# Patient Record
Sex: MALE
[De-identification: names, ages, dates, MRNs, and addresses within clinical notes are randomized per-mention and may not be internally consistent; named-entity substitution may affect disease eponyms.]

---

## 2023-07-07 PROBLEM — Z00.00 ENCOUNTER FOR PREVENTIVE HEALTH EXAMINATION: Status: ACTIVE | Noted: 2023-07-07

## 2023-07-18 ENCOUNTER — APPOINTMENT (OUTPATIENT)
Dept: ORTHOPEDIC SURGERY | Facility: CLINIC | Age: 73
End: 2023-07-18
Payer: MEDICARE

## 2023-07-18 VITALS — WEIGHT: 170 LBS | HEIGHT: 68 IN | BODY MASS INDEX: 25.76 KG/M2

## 2023-07-18 DIAGNOSIS — E78.00 PURE HYPERCHOLESTEROLEMIA, UNSPECIFIED: ICD-10-CM

## 2023-07-18 DIAGNOSIS — I10 ESSENTIAL (PRIMARY) HYPERTENSION: ICD-10-CM

## 2023-07-18 PROCEDURE — J3490M: CUSTOM | Mod: NC

## 2023-07-18 PROCEDURE — 73030 X-RAY EXAM OF SHOULDER: CPT | Mod: LT

## 2023-07-18 PROCEDURE — 20611 DRAIN/INJ JOINT/BURSA W/US: CPT | Mod: LT

## 2023-07-18 PROCEDURE — 99204 OFFICE O/P NEW MOD 45 MIN: CPT | Mod: 25

## 2023-07-18 NOTE — PROCEDURE
[Large Joint Injection] : Large joint injection [Left] : of the left [Shoulder] : shoulder [Inflammation] : inflammation [Alcohol] : alcohol [Betadine] : betadine [Ethyl Chloride sprayed topically] : ethyl chloride sprayed topically [Sterile technique used] : sterile technique used [___ cc    3mg] :  Betamethasone (Celestone) ~Vcc of 3mg [___ cc    0.25%] : Bupivacaine (Marcaine) ~Vcc of 0.25%  [Call if redness, pain or fever occur] : call if redness, pain or fever occur [Apply ice for 15min out of every hour for the next 12-24 hours as tolerated] : apply ice for 15 minutes out of every hour for the next 12-24 hours as tolerated [Patient was advised to rest the joint(s) for ____ days] : patient was advised to rest the joint(s) for [unfilled] days [Previous OTC use and PT nontherapeutic] : patient has tried OTC's including aspirin, Ibuprofen, Aleve, etc or prescription NSAIDS, and/or exercises at home and/or physical therapy without satisfactory response [Risks, benefits, alternatives discussed / Verbal consent obtained] : the risks benefits, and alternatives have been discussed, and verbal consent was obtained [Prior failure or difficult injection] : prior failure or difficult injection [All ultrasound images have been permanently captured and stored accordingly in our picture archiving and communication system] : All ultrasound images have been permanently captured and stored accordingly in our picture archiving and communication system [Visualization of the needle and placement of injection was performed without complication] : visualization of the needle and placement of injection was performed without complication [Pain] : pain

## 2023-07-19 NOTE — DISCUSSION/SUMMARY
[Medication Risks Reviewed] : Medication risks reviewed [Surgical risks reviewed] : Surgical risks reviewed [de-identified] : X-Ray left shoulder reveals mild degenerative changes. \par \par Due to worsening pain and instability with mechanical symptoms, recommend the patient obtain CT arthrogram left shoulder to rule out rotator cuff tear. Follow up after MRI to possibly rule out surgical pathology and discuss future treatment options. \par \par The patient is not a candidate for MRI secondary to schrap metal in his body\par \par Discussed treatment options in the form of injection therapy. Patient elected to receive LEFT SHOULDER 9/2 CSI under ultrasound guidance. Advised patient to rest and ice the area. \par The risks, benefits and contents of the injection have been discussed. Risks include but are not limited to allergic reaction, flare reaction, permanent white skin discoloration at the injection site and infection.  The patient understands the risks and agrees to having the injection.  All questions have been answered.\par Discussed the timing of the injections and the follow up that is needed. Advised the patient to ice the area that was injected and that it may take a few days before experiencing relief.\par \par signs and symptoms of rotator cuff tear, discussed risks of potential rotator cuff  surgery and risks of operative  and non operative treatment of of tendonitis and impingement, will obtain mri to see if surgery is necessary and guide future treatment, in interim discussed use of nsaids and side effects and recommended rehab and discussed risks and benefits of injection \par \par Hx of rotator cuff tear on contralateral shoulder \par Prescribed patient Mobic, and discussed risks of side effects and timing and management of medication.  Side effects can include but are not limited to gi ulcers and irritation, as well as kidney failure and bleeding issues. Use as directed and take with food.\par

## 2023-07-19 NOTE — HISTORY OF PRESENT ILLNESS
[de-identified] : New patient is here for left shoulder. Has been experiencing pain in the left shoulder for about 2 weeks, no specific injury. Has pain in the left shoulder when doing certain movements, exercises and getting dressed. No previous issues with the left shoulder. Pain in left shoulder does not radiate, no n/t. ROM is not impacted. Interested in possibility of injection if possible. Takes Ibuprofen as needed.\par Previously had surgery with Dr. Cornejo on right shoulder a few years ago, no issues now.

## 2023-07-19 NOTE — PHYSICAL EXAM
[5 ___] : forward flexion 5[unfilled]/5 [4___] : external rotation 4[unfilled]/5 [5___] : internal rotation 5[unfilled]/5 [Left] : left shoulder [NL (0-180)] : full active abduction 0-180 degrees [NL (0-70)] : full internal rotation 0-70 degrees [NL (0-90)] : internal rotation at 90 degrees of abduction 0-90 degrees [NL ()] : external rotation at 90 degrees of abduction 50 -110 degrees [Supine] : supine [Sitting] : sitting [Minimal] : minimal [] : negative sulcus sign [FreeTextEntry1] : X-Ray left shoulder reveals mild degenerative changes.

## 2023-07-26 ENCOUNTER — RESULT REVIEW (OUTPATIENT)
Age: 73
End: 2023-07-26

## 2023-07-31 ENCOUNTER — APPOINTMENT (OUTPATIENT)
Dept: ORTHOPEDIC SURGERY | Facility: CLINIC | Age: 73
End: 2023-07-31
Payer: MEDICARE

## 2023-07-31 VITALS — WEIGHT: 170 LBS | HEIGHT: 68 IN | BODY MASS INDEX: 25.76 KG/M2

## 2023-07-31 PROCEDURE — 99214 OFFICE O/P EST MOD 30 MIN: CPT

## 2023-08-01 NOTE — PHYSICAL EXAM
[NL (0-180)] : full active forward flexion 0-180 degrees [NL (0-70)] : full internal rotation 0-70 degrees [NL (0-90)] : internal rotation at 90 degrees of abduction 0-90 degrees [NL ()] : external rotation at 90 degrees of abduction 50 -110 degrees [Supine] : supine [Sitting] : sitting [Minimal] : minimal [5 ___] : forward flexion 5[unfilled]/5 [4___] : external rotation 4[unfilled]/5 [5___] : internal rotation 5[unfilled]/5 [Left] : left shoulder [] : negative sulcus sign [FreeTextEntry1] : X-Ray left shoulder reveals mild degenerative changes.

## 2023-08-01 NOTE — DISCUSSION/SUMMARY
[Medication Risks Reviewed] : Medication risks reviewed [Surgical risks reviewed] : Surgical risks reviewed [de-identified] : Patient expressed no relief from previous cortisone injection (7/18/23 )  st arthrogram of left shoulder revealed high grade articular surface partial tear in the anterior supraspinatus with full thickness component The risks and benefits of surgery have been discussed. Risks include but are not limited to bleeding, infection, reaction to anesthesia, injury to blood vessels and nerves, malunion, nonunion, DVT, PE, necessity of repeat surgery, chronic pain, loss of limb and death. The patient understands the risks and has chosen to proceed with conservative care. All questions have been answered. Discussed activity modification and limiting overhead movements to prevent further aggravation.  We reviewed the mri findings. We discussed treatment options, both operative and non operative. I do think he is a candidate for surgery. Pain relief is a goal as well as improving function and motion.   Interscalene anesthesia, general anesthesia and post operative pain management were discussed. The importance of physical therapy postoperative, the gradual recovery and and the rehabilitation program with initial driving restrictions were noted. The use of a sling for for functional recovery was reviewed. Patient understands there are no garauntees. The benefits of decreased pain, increased function and restoring anatomy were outlined. The risks were reviewed including, but not limited to, infection, failure, bleeding, stiffness, pain, clotting, fracture, retear, hardware failure, deformity, functional limitation, scarring, neurovascular compromise, and narcotic use issues. Under certain circumstances we discussed, further surgery may be indicated.   Patient understands 100% recovery is not expected, and the desired level of function may not be achievable. The complicated nature of the patients condition, including the tear pattern, was noted. We discussed the potential for a prolonged recovery course and the potential for this to affect the patients activites, which could include work regimen. Patients questions were answered. Other options can be pursued, as we discussed.   Patient does wish to proceed with surgery. THis could include shoulder arthoscopy, debridement, synovectomy, decompression, posterior capsuleyosis of adhesions, medium cuff repair, possible biceps tenodesis. We will schedule this at the earliest mutual convenient time. St vs LT issues noted. NSAID uses outlined. Patient will continue HEP, including sleeper stretch. Start PT, wants to try and avoid surgery F/u in 4 weeks

## 2023-08-01 NOTE — HISTORY OF PRESENT ILLNESS
[de-identified] : Patient is here to follow up on CT for left shoulder. Celestone injection from 7/18/23 gave no relief. No improvement. Pain is only present with lifting.

## 2023-09-05 ENCOUNTER — APPOINTMENT (OUTPATIENT)
Dept: ORTHOPEDIC SURGERY | Facility: CLINIC | Age: 73
End: 2023-09-05

## 2023-09-12 ENCOUNTER — APPOINTMENT (OUTPATIENT)
Dept: ORTHOPEDIC SURGERY | Facility: CLINIC | Age: 73
End: 2023-09-12
Payer: MEDICARE

## 2023-09-12 VITALS — BODY MASS INDEX: 25.76 KG/M2 | HEIGHT: 68 IN | WEIGHT: 170 LBS

## 2023-09-12 DIAGNOSIS — S46.012A STRAIN OF MUSCLE(S) AND TENDON(S) OF THE ROTATOR CUFF OF LEFT SHOULDER, INITIAL ENCOUNTER: ICD-10-CM

## 2023-09-12 DIAGNOSIS — M75.52 BURSITIS OF LEFT SHOULDER: ICD-10-CM

## 2023-09-12 PROCEDURE — 99215 OFFICE O/P EST HI 40 MIN: CPT

## 2023-09-12 RX ORDER — OLMESARTAN MEDOXOMIL / AMLODIPINE BESYLATE / HYDROCHLOROTHIAZIDE 40; 10; 25 MG/1; MG/1; MG/1
TABLET, FILM COATED ORAL
Refills: 0 | Status: ACTIVE | COMMUNITY

## 2023-09-12 RX ORDER — SIMVASTATIN 80 MG/1
TABLET, FILM COATED ORAL
Refills: 0 | Status: ACTIVE | COMMUNITY

## 2023-09-12 RX ORDER — FINASTERIDE 1 MG/1
TABLET ORAL
Refills: 0 | Status: ACTIVE | COMMUNITY

## 2023-09-13 PROBLEM — S46.012A TRAUMATIC INCOMPLETE TEAR OF LEFT ROTATOR CUFF, INITIAL ENCOUNTER: Status: ACTIVE | Noted: 2023-07-18

## 2023-09-18 PROBLEM — M75.52 BURSITIS OF LEFT SHOULDER: Status: ACTIVE | Noted: 2023-07-19

## 2023-11-27 ENCOUNTER — APPOINTMENT (OUTPATIENT)
Dept: ORTHOPEDIC SURGERY | Facility: CLINIC | Age: 73
End: 2023-11-27

## 2023-12-13 RX ORDER — OXYCODONE AND ACETAMINOPHEN 10; 325 MG/1; MG/1
10-325 TABLET ORAL
Qty: 40 | Refills: 0 | Status: ACTIVE | COMMUNITY
Start: 2023-12-13

## 2023-12-14 ENCOUNTER — APPOINTMENT (OUTPATIENT)
Age: 73
End: 2023-12-14
Payer: MEDICARE

## 2023-12-14 PROCEDURE — 29826 SHO ARTHRS SRG DECOMPRESSION: CPT | Mod: LT

## 2023-12-14 PROCEDURE — 29823 SHO ARTHRS SRG XTNSV DBRDMT: CPT | Mod: LT

## 2023-12-14 PROCEDURE — 29827 SHO ARTHRS SRG RT8TR CUF RPR: CPT | Mod: LT

## 2023-12-14 PROCEDURE — 29824 SHO ARTHRS SRG DSTL CLAVICLC: CPT | Mod: LT

## 2023-12-14 PROCEDURE — 29828 SHO ARTHRS SRG BICP TENODSIS: CPT | Mod: LT

## 2023-12-18 ENCOUNTER — APPOINTMENT (OUTPATIENT)
Dept: ORTHOPEDIC SURGERY | Facility: CLINIC | Age: 73
End: 2023-12-18
Payer: MEDICARE

## 2023-12-18 VITALS — BODY MASS INDEX: 25.76 KG/M2 | HEIGHT: 68 IN | WEIGHT: 170 LBS

## 2023-12-18 PROCEDURE — 99024 POSTOP FOLLOW-UP VISIT: CPT

## 2023-12-22 NOTE — HISTORY OF PRESENT ILLNESS
[de-identified] : Patient is here for a 1st post op visit from sx on 12/14/23 - left shoulder RTC repair with biceps tenodesis. No issues or concerns since sx.

## 2023-12-22 NOTE — PHYSICAL EXAM
[Left] : left shoulder [] : no drainage [FreeTextEntry9] :  Limited secondary to recent surgery - stable throughout range of motion. [de-identified] :  Limited secondary to recent surgery -

## 2023-12-22 NOTE — DISCUSSION/SUMMARY
[de-identified] :  The patient is approximately 4 days  s/p  left shoulder arthroscopy with rotator cuff repair and biceps tenodesis, and intraarticular circumferential debridement of the labrum and chondral surfaces, and resection of the distal end of the clavicle (DOS: 12/14/23  ) - normal course with good progress and no evidence of infection. Incision sites are well approximated, clean, dry, intact, without drainage, without erythema.   The patient is instructed in wound management.    The patient's post-op plan, protocol and activity modifications have been thoroughly discussed and the patient expressed understanding. Start physical therapy  Continue use of sling  and pillow till he starts physical therapy   Follow up in  3 weeks   IChela, attest that this documentation has been prepared under the direction and in the presence of Provider Dr. Po Cornejo

## 2024-01-09 ENCOUNTER — APPOINTMENT (OUTPATIENT)
Dept: ORTHOPEDIC SURGERY | Facility: CLINIC | Age: 74
End: 2024-01-09

## 2024-01-09 ENCOUNTER — APPOINTMENT (OUTPATIENT)
Dept: ORTHOPEDIC SURGERY | Facility: CLINIC | Age: 74
End: 2024-01-09
Payer: MEDICARE

## 2024-01-09 PROCEDURE — 99024 POSTOP FOLLOW-UP VISIT: CPT

## 2024-01-14 NOTE — HISTORY OF PRESENT ILLNESS
[de-identified] : Patient is here for a post op visit from s on 12/14/23 - left shoulder RTC repair with biceps tenodesis. Currently doing [facility]. Notes improvement, ROM is progressing. Wearing sling.

## 2024-01-14 NOTE — DISCUSSION/SUMMARY
[de-identified] :  The patient is approximately 4 weeks  s/p  left shoulder arthroscopy with rotator cuff repair and biceps tenodesis, and intraarticular circumferential debridement of the labrum and chondral surfaces, and resection of the distal end of the clavicle (DOS: 12/14/23  ) - normal course with good progress and no evidence of infection. Incision sites are well approximated, clean, dry, intact, without drainage, without erythema. The patient is instructed in wound management. The patient's post-op plan, protocol and activity modifications have been thoroughly discussed and the patient expressed understanding.  Continue  physical therapy Discontinue use of pillow Pt can slowly discontinue use of sling but should wear in high activity areas Avoid lifting and reaching with the shoulder    Follow up in  3 weeks   I, Chela Bennett, attest that this documentation has been prepared under the direction and in the presence of Provider Dr. Po Cornejo

## 2024-01-14 NOTE — PHYSICAL EXAM
[Left] : left shoulder [] : no drainage [FreeTextEntry9] : stable throughout range of motion.- improving [de-identified] :  Limited secondary to recent surgery -

## 2024-02-06 ENCOUNTER — APPOINTMENT (OUTPATIENT)
Dept: ORTHOPEDIC SURGERY | Facility: CLINIC | Age: 74
End: 2024-02-06
Payer: MEDICARE

## 2024-02-06 VITALS — HEIGHT: 68 IN | BODY MASS INDEX: 25.76 KG/M2 | WEIGHT: 170 LBS

## 2024-02-06 PROCEDURE — 99024 POSTOP FOLLOW-UP VISIT: CPT

## 2024-02-08 NOTE — DISCUSSION/SUMMARY
[de-identified] : The patient is approximately 2 months s/p left shoulder arthroscopy with rotator cuff repair and biceps tenodesis, and intraarticular circumferential debridement of the labrum and chondral surfaces, and resection of the distal end of the clavicle (DOS: 12/14/23) - normal course with good progress and no evidence of infection. Incision sites are well healed.  The patient's post-op plan, protocol and activity modifications have been thoroughly discussed and the patient expressed understanding. Still avoid any heavy lifting.   Continue physical therapy  He may discontinue use of sling.    Follow up in 4 weeks

## 2024-02-08 NOTE — PHYSICAL EXAM
[Left] : left shoulder [Sitting] : sitting [4 ___] : forward flexion 4[unfilled]/5 [4___] : internal rotation 4[unfilled]/5 [] : no sensory deficits [FreeTextEntry9] : stable throughout range of motion - improving [TWNoteComboBox4] : passive forward flexion 150 degrees [TWNoteComboBox9] : passive abduction 150 degrees

## 2024-03-05 ENCOUNTER — APPOINTMENT (OUTPATIENT)
Dept: ORTHOPEDIC SURGERY | Facility: CLINIC | Age: 74
End: 2024-03-05

## 2024-03-12 ENCOUNTER — APPOINTMENT (OUTPATIENT)
Dept: ORTHOPEDIC SURGERY | Facility: CLINIC | Age: 74
End: 2024-03-12
Payer: MEDICARE

## 2024-03-12 PROCEDURE — 99024 POSTOP FOLLOW-UP VISIT: CPT

## 2024-03-14 NOTE — PHYSICAL EXAM
[Left] : left shoulder [Sitting] : sitting [4 ___] : forward flexion 4[unfilled]/5 [4___] : internal rotation 4[unfilled]/5 [] : no sensory deficits [FreeTextEntry9] : stable throughout range of motion - improving [TWNoteComboBox9] : passive abduction 150 degrees [TWNoteComboBox4] : passive forward flexion 150 degrees

## 2024-03-14 NOTE — HISTORY OF PRESENT ILLNESS
[de-identified] : Patient is here for a post operative appointment for the left shoulder. 12/14/23 left rotator cuff repair. Patient notes pain has improved since the previous visit. Patient notes pain is most prevalent when raising the arm past shoulder level. Patient is currently attending physical therapy at [facility] in [place].

## 2024-03-14 NOTE — DISCUSSION/SUMMARY
[de-identified] : The patient is approximately 3 months s/p left shoulder arthroscopy with rotator cuff repair and biceps tenodesis, and intraarticular circumferential debridement of the labrum and chondral surfaces, and resection of the distal end of the clavicle (DOS: 12/14/23) - normal course with good progress and no evidence of infection. Incision sites are well healed.  The patient's post-op plan, protocol and activity modifications have been thoroughly discussed and the patient expressed understanding. Still avoid any heavy lifting. Advised the patient that his post-operative progressing is moving slow secondary to poor bone quality upon surgical intervention. He did have some degenerative changes.   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    Continue physical therapy to work on strengthening.    Follow up in 4 weeks

## 2024-04-16 ENCOUNTER — APPOINTMENT (OUTPATIENT)
Dept: ORTHOPEDIC SURGERY | Facility: CLINIC | Age: 74
End: 2024-04-16
Payer: MEDICARE

## 2024-04-16 DIAGNOSIS — M19.012 PRIMARY OSTEOARTHRITIS, LEFT SHOULDER: ICD-10-CM

## 2024-04-16 DIAGNOSIS — Z98.890 OTHER SPECIFIED POSTPROCEDURAL STATES: ICD-10-CM

## 2024-04-16 PROCEDURE — 99213 OFFICE O/P EST LOW 20 MIN: CPT

## 2024-04-23 NOTE — PHYSICAL EXAM
[Left] : left shoulder [Sitting] : sitting [4 ___] : forward flexion 4[unfilled]/5 [4___] : abduction 4[unfilled]/5 [5___] : internal rotation 5[unfilled]/5 [] : no sensory deficits [FreeTextEntry9] : stable throughout range of motion  [TWNoteComboBox4] : passive forward flexion 165 degrees [TWNoteComboBox9] : passive abduction 165 degrees

## 2024-04-23 NOTE — DISCUSSION/SUMMARY
[de-identified] : The patient is approximately 4 months s/p left shoulder arthroscopy with rotator cuff repair and biceps tenodesis, and intraarticular circumferential debridement of the labrum and chondral surfaces, and resection of the distal end of the clavicle (DOS: 12/14/23) - normal course with good progress and no evidence of infection. He is experiencing some post-operative biceps tendonitis, recommended continued strengthening. Incision sites are well healed.  The patient's post-op plan, protocol and activity modifications have been thoroughly discussed and the patient expressed understanding.   We discussed the importance of increasing activity gradually considering poor bone quality upon surgical intervention. He did have some degenerative changes. He will continue to advance with strengthening, building back to shooting range by memorial day.   Physical therapy has recently been denied, recommended he continue to work on stretching and strengthening on his own. Recommended he limit [place] activity considering he reports recent injury to quadriceps/adductor. Would recommended PT however, he has used up all visits for shoulder.   Follow up in 4 weeks

## 2024-04-23 NOTE — HISTORY OF PRESENT ILLNESS
[de-identified] : Patient is here to follow up on left shoulder. Had RTC repair with biceps tenodesis on 12/14/23. Was attending PT at [facility]. Notes improvement, ROM is progressing. Discomfort with overhead motion. Radiating pain into bicep.